# Patient Record
(demographics unavailable — no encounter records)

---

## 2025-05-19 NOTE — HISTORY OF PRESENT ILLNESS
[FreeTextEntry1] : Patient is a 27-year-old here for an annual gynecological follow-up without complaints. Patient is 6 months postpartum pumping every 4-5 hours. Patient is on the minipill and is amenorrheic.

## 2025-05-19 NOTE — PLAN
[FreeTextEntry1] : Patient has a normal annual gynecological exam. Pap smear was done, breast self-examination instructed. Birth control with weaning reviewed with patient. Patient to follow-up per routine. The patient has been counseled regarding the following topics: patient screened for depression - no signs of clinical depression. PHQ9 scores reviewed over the course of the visit. 5-10 minutes of face to face time.

## 2025-05-19 NOTE — PHYSICAL EXAM
[Chaperoned Physical Exam] : A chaperone was present in the examining room during all aspects of the physical examination. [MA] : MA [Appropriately responsive] : appropriately responsive [Alert] : alert [No Acute Distress] : no acute distress [No Lymphadenopathy] : no lymphadenopathy [Clear to Auscultation B/L] : clear to auscultation bilaterally [Soft] : soft [Non-tender] : non-tender [Non-distended] : non-distended [No HSM] : No HSM [No Lesions] : no lesions [No Mass] : no mass [Oriented x3] : oriented x3 [Examination Of The Breasts] : a normal appearance [No Masses] : no breast masses were palpable [Labia Majora] : normal [Labia Minora] : normal [Normal] : normal [Anteversion] : anteverted [Uterine Adnexae] : normal [FreeTextEntry2] : Kiana Gibson